# Patient Record
Sex: FEMALE | Race: WHITE | Employment: UNEMPLOYED | URBAN - METROPOLITAN AREA
[De-identification: names, ages, dates, MRNs, and addresses within clinical notes are randomized per-mention and may not be internally consistent; named-entity substitution may affect disease eponyms.]

---

## 2019-04-20 LAB
CREATININE, EXTERNAL: 0.73
HBA1C MFR BLD HPLC: 8.7 %
LDL-C, EXTERNAL: 113

## 2019-06-05 ENCOUNTER — OFFICE VISIT (OUTPATIENT)
Dept: ENDOCRINOLOGY | Age: 54
End: 2019-06-05

## 2019-06-05 VITALS
TEMPERATURE: 97 F | WEIGHT: 188 LBS | HEART RATE: 76 BPM | BODY MASS INDEX: 30.22 KG/M2 | SYSTOLIC BLOOD PRESSURE: 143 MMHG | RESPIRATION RATE: 14 BRPM | HEIGHT: 66 IN | OXYGEN SATURATION: 97 % | DIASTOLIC BLOOD PRESSURE: 78 MMHG

## 2019-06-05 DIAGNOSIS — E78.2 MIXED HYPERLIPIDEMIA: ICD-10-CM

## 2019-06-05 DIAGNOSIS — E11.29 TYPE 2 DIABETES MELLITUS WITH OTHER DIABETIC KIDNEY COMPLICATION (HCC): Primary | ICD-10-CM

## 2019-06-05 DIAGNOSIS — I10 ESSENTIAL HYPERTENSION: ICD-10-CM

## 2019-06-05 PROBLEM — Z86.73 HISTORY OF CVA (CEREBROVASCULAR ACCIDENT): Status: ACTIVE | Noted: 2019-06-05

## 2019-06-05 PROBLEM — I21.3 STEMI (ST ELEVATION MYOCARDIAL INFARCTION) (HCC): Status: ACTIVE | Noted: 2019-01-08

## 2019-06-05 PROBLEM — J31.0 CHRONIC RHINITIS: Status: ACTIVE | Noted: 2019-01-28

## 2019-06-05 PROBLEM — K80.10 CHRONIC CALCULOUS CHOLECYSTITIS: Status: ACTIVE | Noted: 2018-07-10

## 2019-06-05 PROBLEM — L30.9 DERMATITIS OF BOTH FEET: Status: ACTIVE | Noted: 2019-02-01

## 2019-06-05 PROBLEM — E11.42 DIABETIC PERIPHERAL NEUROPATHY (HCC): Status: ACTIVE | Noted: 2017-12-20

## 2019-06-05 RX ORDER — ELECTROLYTES/DEXTROSE
SOLUTION, ORAL ORAL
Refills: 0 | COMMUNITY
Start: 2019-05-24

## 2019-06-05 RX ORDER — IBUPROFEN 600 MG/1
TABLET ORAL
COMMUNITY
Start: 2019-06-03

## 2019-06-05 RX ORDER — LANOLIN ALCOHOL/MO/W.PET/CERES
1000 CREAM (GRAM) TOPICAL
COMMUNITY

## 2019-06-05 RX ORDER — LANOLIN ALCOHOL/MO/W.PET/CERES
CREAM (GRAM) TOPICAL
COMMUNITY
Start: 2014-05-30

## 2019-06-05 RX ORDER — LORAZEPAM 0.5 MG/1
TABLET ORAL
Refills: 0 | COMMUNITY
Start: 2019-04-19

## 2019-06-05 RX ORDER — GUAIFENESIN 100 MG/5ML
81 LIQUID (ML) ORAL
COMMUNITY
Start: 2019-02-05

## 2019-06-05 RX ORDER — DOCUSATE SODIUM 100 MG/1
CAPSULE, LIQUID FILLED ORAL
Refills: 5 | COMMUNITY
Start: 2019-04-19

## 2019-06-05 RX ORDER — POTASSIUM &MAGNESIUM ASPARTATE 250-250 MG
CAPSULE ORAL
COMMUNITY

## 2019-06-05 RX ORDER — LEVOTHYROXINE SODIUM 150 UG/1
150 TABLET ORAL
COMMUNITY
Start: 2019-01-16

## 2019-06-05 RX ORDER — NITROGLYCERIN 0.4 MG/1
0.4 TABLET SUBLINGUAL
COMMUNITY
Start: 2019-01-10

## 2019-06-05 RX ORDER — OMEGA-3-ACID ETHYL ESTERS 1 G/1
CAPSULE, LIQUID FILLED ORAL
COMMUNITY
Start: 2014-05-15

## 2019-06-05 RX ORDER — VALACYCLOVIR HYDROCHLORIDE 500 MG/1
TABLET, FILM COATED ORAL AS NEEDED
Refills: 12 | COMMUNITY
Start: 2019-03-27

## 2019-06-05 RX ORDER — CETIRIZINE HCL 10 MG
10 TABLET ORAL
COMMUNITY
Start: 2019-01-28

## 2019-06-05 RX ORDER — ACETAMINOPHEN AND CODEINE PHOSPHATE 300; 30 MG/1; MG/1
TABLET ORAL
Refills: 0 | COMMUNITY
Start: 2019-05-09

## 2019-06-05 RX ORDER — NYSTATIN AND TRIAMCINOLONE ACETONIDE 100000; 1 [USP'U]/G; MG/G
OINTMENT TOPICAL
COMMUNITY
Start: 2019-02-02

## 2019-06-05 RX ORDER — GLIPIZIDE 10 MG/1
10 TABLET ORAL 2 TIMES DAILY
Qty: 180 TAB | Refills: 3 | Status: CANCELLED | OUTPATIENT
Start: 2019-06-05

## 2019-06-05 RX ORDER — GABAPENTIN 300 MG/1
300 CAPSULE ORAL
COMMUNITY
Start: 2019-01-16

## 2019-06-05 RX ORDER — GLIPIZIDE 10 MG/1
10 TABLET ORAL 2 TIMES DAILY
COMMUNITY
Start: 2019-06-04

## 2019-06-05 RX ORDER — CALCIUM CITRATE/VITAMIN D3 200MG-6.25
TABLET ORAL
Refills: 5 | COMMUNITY
Start: 2019-05-01

## 2019-06-05 RX ORDER — ACETAMINOPHEN 500 MG
2000 TABLET ORAL
COMMUNITY
Start: 2019-05-16

## 2019-06-05 RX ORDER — PRASUGREL 10 MG/1
TABLET, FILM COATED ORAL
Refills: 3 | COMMUNITY
Start: 2019-05-19

## 2019-06-05 NOTE — LETTER
6/5/19 Patient: Brooklyn Mohamud YOB: 1965 Date of Visit: 6/5/2019 [id number] [place] [id number] VIA Facsimile: [phone] Dear Zenaida Langston NP, Thank you for referring Ms. Brooklyn Mohamud to [id number] [place] [address] for evaluation. My notes for this consultation are attached. If you have questions, please do not hesitate to call me. I look forward to following your patient along with you. Sincerely, Karuna Erickson MD

## 2019-06-05 NOTE — PROGRESS NOTES
Marcia Carrillo is a 48 y.o. female here for   Chief Complaint   Patient presents with    New Patient     self referred for DM and Thyroid       Functional glucose monitor and record keeping system? -yes   Eye exam within last year? - due   Foot exam within last year? - due    1. Have you been to the ER, urgent care clinic since your last visit? Hospitalized since your last visit? -n/a    2. Have you seen or consulted any other health care providers outside of the [address] [place] [address] since your last visit?   Include any pap smears or colon screening.-n/a

## 2019-06-05 NOTE — TELEPHONE ENCOUNTER
Asked pt to call back with a local pharmay for Jardiance and where to send [place] [facility] [place] and glipizide?

## 2019-06-05 NOTE — PROGRESS NOTES
[facility] ENDOCRINOLOGY               Lory Borden MD        [address] 78 [phone] Fax [phone]               Patient Information  Date:6/5/2019  Name : Fausto Meyer 48 y.o.     YOB: 1965         Referred by: Mariam Major NP         Chief Complaint   Patient presents with   [address] [place] [address] Patient     referred for DM and Thyroid       History of Present Illness: Fausto Meyer is a 48 y.o. female here for initial visit of  Type 2 Diabetes Mellitus. Type 2 Diabetes was diagnosed 2009. End organ effects of diabetes: peripheral neuropathy and cardiovascular disease. Cardiovascular risk factors: dyslipidemia, diabetes mellitus   Monitoring frequency:2 /day and readings run 200-250. She is on glipizide, Starlix, could not tolerate metformin, on Tradjenta. Had dry mouth with Invokana. She has underlying CHF  Blood glucose is fluctuating, no severe hypoglycemia  Reportedly gaining weight with glipizide,  She is learning carbohydrate counting, does not want to be on insulin  Complains of cold feet, tingling and numbness  No chest pain,, Shortness of breath    Primary hypothyroidism on replacement thyroxine, on brand Synthroid        Wt Readings from Last 3 Encounters:   06/05/19 188 lb (85.3 kg)       BP Readings from Last 3 Encounters:   06/05/19 143/78           Past Medical History:   Diagnosis Date    Acquired hypothyroidism     Dermatitis of both feet     Diabetic peripheral neuropathy (HCC)     Essential hypertension, benign     Hand dermatitis     ST elevation myocardial infarction (STEMI) involving right coronary artery in recovery phase (HCC)     Type 2 diabetes mellitus (HCC)      Current Outpatient Medications   Medication Sig    aspirin 81 mg chewable tablet Take 81 mg by mouth.  glipiZIDE (GLUCOTROL) 10 mg tablet Take 10 mg by mouth two (2) times a day.     linagliptin (TRADJENTA) 5 mg tablet TAKE 1 TABLET BY MOUTH EVERY DAY    levothyroxine (SYNTHROID) 150 mcg tablet Take 150 mcg by mouth.  niacin SR (NIACIN SR) 500 mg CR capsule Take  by mouth.  gabapentin (NEURONTIN) 300 mg capsule Take 300 mg by mouth.  cetirizine (ZYRTEC) 10 mg tablet Take 10 mg by mouth.  cholecalciferol (VITAMIN D3) 2,000 unit cap capsule Take 2,000 Units by mouth.  pantothenic ac-min oil-pet,hyd (AQUAPHOR ORIGINAL) 41 % ointment Apply  to affected area.  omega-3 acid ethyl esters (LOVAZA) 1 gram capsule Take  by mouth.  nystatin-triamcinolone (MYCOLOG) 100,000-0.1 unit/gram-% ointment by IntraTYMPanic route.  nitroglycerin (NITROSTAT) 0.4 mg SL tablet 0.4 mg by SubLINGual route.  ibuprofen (MOTRIN) 600 mg tablet TAKE 1 TABLET BY MOUTH EVERY 6 HOURS AS NEEDED FOR PAIN    biotin (VITAMIN B7) 5 mg capsule TAKE 1 CAPSULES BY MOUTH DAILY    TRUE METRIX GLUCOSE TEST STRIP strip TEST UP TO 4 TIMES DAILY    prasugrel (EFFIENT) 10 mg tablet TAKE 1 TABLET BY MOUTH EVERY DAY    LORazepam (ATIVAN) 0.5 mg tablet TAKE 1 TABLET BY MOUTH TWICE A DAY AS NEEDED FOR ANXIETY    docusate sodium (COLACE) 100 mg capsule TAKE 1 CAPSULE BY MOUTH TWICE A DAY    cyanocobalamin 1,000 mcg tablet Take 1,000 mcg by mouth.  cranberry 500 mg capsule Take  by mouth.  MILK THISTLE PO Take  by mouth.  LUTEIN PO Take  by Mouth.  Lactobac no. 41-Bifidobact no.7 (PROBIOTIC-10) 70 mg (3 billion cell) cap Take 1 Cap by mouth.  acetaminophen-codeine (TYLENOL #3) 300-30 mg per tablet     valACYclovir (VALTREX) 500 mg tablet as needed. No current facility-administered medications for this visit.       Allergies   Allergen Reactions    Canagliflozin Other (comments)     \"severe dry mouth\"  \"severe dry mouth\"      Chlorpheniramine-Phenylephrine Hives    Levothyroxine Other (comments)     Unable to take generic  Unable to take generic      Lisinopril Cough    Metformin Nausea and Vomiting and Other (comments)     Other reaction(s): gi distress  Other reaction(s): gi distress      Pseudoephedrine Hives    Statins-Hmg-Coa Reductase Inhibitors Nausea and Vomiting    Ticagrelor Other (comments)    Triprolidine-Pseudoephedrine Hives       Review of Systems:  All 10 systems reviewed and are negative other than mentioned in HPI    Physical Examination:   Blood pressure 143/78, pulse 76, temperature 97 °F (36.1 °C), temperature source Oral, resp. rate 14, height 5' 5.5\" (1.664 m), weight 188 lb (85.3 kg), SpO2 97 %. Estimated body mass index is 30.81 kg/m² as calculated from the following:    Height as of this encounter: 5' 5.5\" (1.664 m). Weight as of this encounter: 188 lb (85.3 kg). - General: pleasant, no distress, good eye contact  - HEENT: no pallor, no periorbital edema, EOMI  - Neck: supple, no thyromegaly, no nodules  - Cardiovascular: regular,  normal S1 and S2, no murmurs  - Respiratory: clear to auscultation bilaterally  - Gastrointestinal: soft, nontender, nondistended,  BS +  - Musculoskeletal: no proximal muscle weakness in upper or lower extremities  - Integumentary: no acanthosis nigricans,no edema,   - Neurological: alert and oriented  - Psychiatric: normal mood and affect  - Skin: color, texture, turgor normal.       Diabetic foot exam: June 2019    Left:     Vibratory sensation absent   Filament test decreased sensation with micro filament   Pulse DP: 1+    Deformities: Callus  Right:    Vibratory sensation absent   Filament test decreased sensation with micro filament   Pulse DP: 1+   Deformities: Callus          Data Reviewed:       No results found for: HBA1C, XFH8SWFT, HGBE8, GLU, GESTF, GLUCPOC, MCACR, MCA1, MCA2, MCA3, MCAU, LDL, LDLC, DLDLP, AMMY, CREAPOC, ACREA, CREA, REFC3, REFC4, YFD7DAXT, NKS0PJNC   No results found for: GFRNA, GFRNAPOC, GFRAA, GFRAAPOC, CREA, CREAPOC, BUN, IBUN, BUNPOC, NA, NAPOC, K, KPOCT, CL, CLPOC, CO2, CO2POC, MG, PHOS, ALBEU, PTH, PTHILT, EPO    Assessment/Plan:     1.  Type 2 diabetes mellitus with other diabetic kidney complication ([facility] [address]        1. Type 2 Diabetes Mellitus with neuropathy  No results found for: HBA1C, HGBE8, AFH1NZFV, MWN3ZBXX, AXW8YJVE  Discussed the pathophysiology, she understands the importance of low carbohydrate diet and increase activity  Check blood glucose 2-3 times daily. She did not want GLP-1 agonist due to being an injection  Discontinue Starlix while on glipizide. Given CHF, we need to retry SGLT2 inhibitors, looks like she had dehydration with Invokana, Jardiance 25 mg, hydration discussed, yeast infection possibility discussed  Januvia 100 mg in the morning    Advised to check glucose 2 - 4 times daily    Diabetic issues reviewed : glycemic goals , written exchange diet given, low carbohydrate diet, weight control , home glucose monitoring emphasized,  hypoglycemia management and long term diabetic complications discussed. FLU annually ,Pneumovax ,aspirin daily,annual eye exam,microalbumin    2. HTN : Continue current therapy     3. Hyperlipidemia : Statin intolerance    4. Obesity:Body mass index is 30.81 kg/m². Discussed about the importance of exercise and carbohydrate portion control. 5.  Primary hypothyroidism on levothyroxine managed by PCP    6. Peripheral neuropathy: Foot care      There are no Patient Instructions on file for this visit. Follow-up and Dispositions    · Return in about 3 months (around 9/5/2019) for labs before next visit and follow up. Thank you for allowing me to participate in the care of this patient. René Bloom MD      Patient verbalized understanding     Voice-recognition software was used to generate this report, which may result in some phonetic-based errors in the grammar and contents. Even though attempts were made to correct all the mistakes, some may have been missed and remained in the body of the report.

## 2019-06-13 ENCOUNTER — TELEPHONE (OUTPATIENT)
Dept: ENDOCRINOLOGY | Age: 54
End: 2019-06-13

## 2019-06-14 ENCOUNTER — TELEPHONE (OUTPATIENT)
Dept: ENDOCRINOLOGY | Age: 54
End: 2019-06-14

## 2019-06-14 NOTE — TELEPHONE ENCOUNTER
Pt was not asking for coupon she needed rx sent to [facility]. She wanted to make sure it did not require a PA. She called her pharmacy and [facility] is ready for . No further questions or concerns at this time.

## 2019-06-14 NOTE — TELEPHONE ENCOUNTER
----- Message from [facility] sent at 6/14/2019  8:48 AM EDT -----  Regarding: Dr. Farida Marsh: [phone]  Pt is returning a missed call.

## 2019-08-02 LAB
ALBUMIN SERPL-MCNC: 4.4 G/DL (ref 3.5–5.5)
ALBUMIN/CREAT UR: 165.8 MG/G CREAT (ref 0–30)
ALBUMIN/GLOB SERPL: 1.3 {RATIO} (ref 1.2–2.2)
ALP SERPL-CCNC: 73 IU/L (ref 39–117)
ALT SERPL-CCNC: 12 IU/L (ref 0–32)
AST SERPL-CCNC: 12 IU/L (ref 0–40)
BILIRUB SERPL-MCNC: 0.9 MG/DL (ref 0–1.2)
BUN SERPL-MCNC: 19 MG/DL (ref 6–24)
BUN/CREAT SERPL: 24 (ref 9–23)
CALCIUM SERPL-MCNC: 9.7 MG/DL (ref 8.7–10.2)
CHLORIDE SERPL-SCNC: 102 MMOL/L (ref 96–106)
CO2 SERPL-SCNC: 24 MMOL/L (ref 20–29)
CREAT SERPL-MCNC: 0.78 MG/DL (ref 0.57–1)
CREAT UR-MCNC: 50.6 MG/DL
EST. AVERAGE GLUCOSE BLD GHB EST-MCNC: 192 MG/DL
GLOBULIN SER CALC-MCNC: 3.4 G/DL (ref 1.5–4.5)
GLUCOSE SERPL-MCNC: 194 MG/DL (ref 65–99)
HBA1C MFR BLD: 8.3 % (ref 4.8–5.6)
MICROALBUMIN UR-MCNC: 83.9 UG/ML
POTASSIUM SERPL-SCNC: 5.4 MMOL/L (ref 3.5–5.2)
PROT SERPL-MCNC: 7.8 G/DL (ref 6–8.5)
SODIUM SERPL-SCNC: 140 MMOL/L (ref 134–144)
SPECIMEN STATUS REPORT, ROLRST: NORMAL

## 2020-10-06 NOTE — TELEPHONE ENCOUNTER
Left msg informing pt we cannot send savings card to pharmacy. She would have to come by the office or we can mail it to her. A callback number was left.
Patient called back to verify pharmacy as the [facility] on file to send savings card per previous message
Opt out